# Patient Record
Sex: MALE | Race: WHITE | NOT HISPANIC OR LATINO | Employment: PART TIME | URBAN - METROPOLITAN AREA
[De-identification: names, ages, dates, MRNs, and addresses within clinical notes are randomized per-mention and may not be internally consistent; named-entity substitution may affect disease eponyms.]

---
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
## 2019-01-10 ENCOUNTER — OFFICE VISIT (OUTPATIENT)
Dept: PSYCHOLOGY | Facility: CLINIC | Age: 19
End: 2019-01-10
Payer: COMMERCIAL

## 2019-01-10 DIAGNOSIS — F33.1 MAJOR DEPRESSIVE DISORDER, RECURRENT EPISODE, MODERATE (H): ICD-10-CM

## 2019-01-10 DIAGNOSIS — F41.1 GENERALIZED ANXIETY DISORDER: Primary | ICD-10-CM

## 2019-01-10 PROCEDURE — 90834 PSYTX W PT 45 MINUTES: CPT | Performed by: PSYCHOLOGIST

## 2019-01-10 ASSESSMENT — ANXIETY QUESTIONNAIRES
GAD7 TOTAL SCORE: 10
5. BEING SO RESTLESS THAT IT IS HARD TO SIT STILL: MORE THAN HALF THE DAYS
6. BECOMING EASILY ANNOYED OR IRRITABLE: SEVERAL DAYS
IF YOU CHECKED OFF ANY PROBLEMS ON THIS QUESTIONNAIRE, HOW DIFFICULT HAVE THESE PROBLEMS MADE IT FOR YOU TO DO YOUR WORK, TAKE CARE OF THINGS AT HOME, OR GET ALONG WITH OTHER PEOPLE: SOMEWHAT DIFFICULT
2. NOT BEING ABLE TO STOP OR CONTROL WORRYING: SEVERAL DAYS
7. FEELING AFRAID AS IF SOMETHING AWFUL MIGHT HAPPEN: SEVERAL DAYS
3. WORRYING TOO MUCH ABOUT DIFFERENT THINGS: SEVERAL DAYS
1. FEELING NERVOUS, ANXIOUS, OR ON EDGE: MORE THAN HALF THE DAYS

## 2019-01-10 ASSESSMENT — PATIENT HEALTH QUESTIONNAIRE - PHQ9
SUM OF ALL RESPONSES TO PHQ QUESTIONS 1-9: 12
5. POOR APPETITE OR OVEREATING: MORE THAN HALF THE DAYS

## 2019-01-10 NOTE — PROGRESS NOTES
"               Progress Note - Initial Session    Client Name:  Pa Galvan Date: 1/10/2019     Service Type: Individual/ADHD Eval Intake      Session Start Time: 12:00  Session End Time: 12:45       Session Length: 45 minutes      Session #: 1     Attendees: Client attended alone         Diagnostic Assessment in progress.  Unable to complete documentation at the conclusion of the first session due to gathering extensive information regarding symptom presentation, history, and impact on functioning. Client reported significant history of anxiety, depression, and anger. No risk issues reported.      Mental Status Assessment:  Appearance:   Appropriate   Eye Contact:   Good   Psychomotor Behavior: Normal   Attitude:   Cooperative   Orientation:   All  Speech   Rate / Production: Normal    Volume:  Normal   Mood:    Calm ; Client seemed very relaxed and explained that he had smoked marijuana this morning but did not feel \"high\"  Affect:    Appropriate   Thought Content:  Clear   Thought Form:  Coherent  Logical   Insight:    Good       Safety Issues and Plan for Safety and Risk Management:  Client denies current fears or concerns for personal safety.  Client denies current or recent suicidal ideation or behaviors.  Client denies current or recent homicidal ideation or behaviors.  Client denies current or recent self injurious behavior or ideation.  Client denies other safety concerns.  A safety and risk management plan has not been developed at this time, however client was given the after-hours number / 911 should there be a change in any of these risk factors.  Client reports there are no firearms in the house.      Diagnostic Criteria:  A. Excessive anxiety and worry about a number of events or activities (such as work or school performance).   B. The person finds it difficult to control the worry.  C. Select 3 or more symptoms (required for diagnosis). Only one item is required in children.   - Restlessness or " feeling keyed up or on edge.    - Being easily fatigued.    - Difficulty concentrating or mind going blank.    - Irritability.    - Muscle tension.    - Sleep disturbance (difficulty falling or staying asleep, or restless unsatisfying sleep).   D. The focus of the anxiety and worry is not confined to features of an Axis I disorder.  E. The anxiety, worry, or physical symptoms cause clinically significant distress or impairment in social, occupational, or other important areas of functioning.   F. The disturbance is not due to the direct physiological effects of a substance (e.g., a drug of abuse, a medication) or a general medical condition (e.g., hyperthyroidism) and does not occur exclusively during a Mood Disorder, a Psychotic Disorder, or a Pervasive Developmental Disorder.  A) Recurrent episode(s) - symptoms have been present during the same 2-week period and represent a change from previous functioning 5 or more symptoms (required for diagnosis)   - Depressed mood. Note: In children and adolescents, can be irritable mood.     - Diminished interest or pleasure in all, or almost all, activities.    - Decreased sleep.    - Psychomotor activity agitation.    - Fatigue or loss of energy.    - Feelings of worthlessness or inappropriate guilt.    - Diminished ability to think or concentrate, or indecisiveness.   B) The symptoms cause clinically significant distress or impairment in social, occupational, or other important areas of functioning  C) The episode is not attributable to the physiological effects of a substance or to another medical condition  D) The occurence of major depressive episode is not better explained by other thought / psychotic disorders  E) There has never been a manic episode or hypomanic episode        DSM5 Diagnoses: (Sustained by DSM5 Criteria Listed Above)  Diagnoses: 296.32 (F33.1) Major Depressive Disorder, Recurrent Episode, Moderate _  300.02 (F41.1) Generalized Anxiety Disorder    Cannabis Use Disorder, Moderate  Psychosocial & Contextual Factors: daily marijuana use  WHODAS 2.0 (12 item)            This questionnaire asks about difficulties due to health conditions. Health conditions  include  disease or illnesses, other health problems that may be short or long lasting,  injuries, mental health or emotional problems, and problems with alcohol or drugs.                     Think back over the past 30 days and answer these questions, thinking about how much  difficulty you had doing the following activities. For each question, please [place] only  one response.    S1 Standing for long periods such as 30 minutes? None =         1   S2 Taking care of household responsibilities? None =         1   S3 Learning a new task, for example, learning how to get to a new place? None =         1   S4 How much of a problem do you have joining community activities (for example, festivals, [employer] or other activities) in the same way as anyone else can? None =         1   S5 How much have you been emotionally affected by your health problems? Mild =           2     In the past 30 days, how much difficulty did you have in:   S6 Concentrating on doing something for ten minutes? Mild =           2   S7 Walking a long distance such as a kilometer (or equivalent)? None =         1   S8 Washing your whole body? None =         1   S9 Getting dressed? None =         1   S10 Dealing with people you do not know? None =         1   S11 Maintaining a friendship? None =         1   S12 Your day to day work? Mild =           2     H1 Overall, in the past 30 days, how many days were these difficulties present? Record number of days 19   H2 In the past 30 days, for how many days were you totally unable to carry out your usual activities or work because of any health condition? Record number of days  0   H3 In the past 30 days, not counting the days that you were totally unable, for how many days did you cut back or  reduce your usual activities or work because of any health condition? Record number of days 5       Collateral Reports Completed:  Not Applicable      PLAN: (Homework, other):  Client RTC to complete ADHD DA. He was given self-report and collateral-report measures to complete for our next session.      Sena Chester, PhD, LP

## 2019-01-11 ASSESSMENT — ANXIETY QUESTIONNAIRES: GAD7 TOTAL SCORE: 10

## 2019-04-03 ENCOUNTER — OFFICE VISIT (OUTPATIENT)
Dept: FAMILY MEDICINE | Facility: CLINIC | Age: 19
End: 2019-04-03
Payer: COMMERCIAL

## 2019-04-03 ENCOUNTER — ANCILLARY PROCEDURE (OUTPATIENT)
Dept: GENERAL RADIOLOGY | Facility: CLINIC | Age: 19
End: 2019-04-03
Attending: FAMILY MEDICINE
Payer: COMMERCIAL

## 2019-04-03 VITALS
DIASTOLIC BLOOD PRESSURE: 70 MMHG | SYSTOLIC BLOOD PRESSURE: 116 MMHG | HEIGHT: 67 IN | RESPIRATION RATE: 14 BRPM | BODY MASS INDEX: 24.17 KG/M2 | TEMPERATURE: 98 F | OXYGEN SATURATION: 98 % | HEART RATE: 64 BPM | WEIGHT: 154 LBS

## 2019-04-03 DIAGNOSIS — M22.42 CHONDROMALACIA OF PATELLA, LEFT: ICD-10-CM

## 2019-04-03 DIAGNOSIS — M25.532 LEFT WRIST PAIN: ICD-10-CM

## 2019-04-03 DIAGNOSIS — M76.31 ILIOTIBIAL BAND SYNDROME, RIGHT: ICD-10-CM

## 2019-04-03 DIAGNOSIS — M25.532 LEFT WRIST PAIN: Primary | ICD-10-CM

## 2019-04-03 PROCEDURE — 73110 X-RAY EXAM OF WRIST: CPT | Mod: LT

## 2019-04-03 PROCEDURE — 99213 OFFICE O/P EST LOW 20 MIN: CPT | Performed by: FAMILY MEDICINE

## 2019-04-03 ASSESSMENT — MIFFLIN-ST. JEOR: SCORE: 1672.17

## 2019-04-03 NOTE — PATIENT INSTRUCTIONS
These are overuse injuries of your wrist, knee and hip.  I suspect bruise of the triangular fibrocartilage complex of your left wrist, iliotibial band syndrome and left patellar costochondritis.  Ok to use wrist brace for comfort.  Try to avoid activities that cause pain so that you can heal.

## 2019-04-03 NOTE — PROGRESS NOTES
"  SUBJECTIVE:   Pa Galvan is a 18 year old male who presents to clinic today for the following health issues:  +      Musculoskeletal problem/pain      Duration: 2-3 months    Description  Location: left wrist    Intensity:  moderate    Accompanying signs and symptoms: none    History  Previous similar problem: no   Previous evaluation:  none    Precipitating or alleviating factors:  Trauma or overuse: no   Aggravating factors include: overuse    Therapies tried and outcome: nothing      Notes pain with twisting left wrist.    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    Schedule hasn't changed much.    Notes discomfort of left kneecap with heavy lifting and of right hip at times.    EXAM:  /70   Pulse 64   Temp 98  F (36.7  C) (Oral)   Resp 14   Ht 1.702 m (5' 7\")   Wt 69.9 kg (154 lb)   SpO2 98%   BMI 24.12 kg/m    Constitutional: Healthy, alert, no distress   Cardiovascular: RRR. No murmurs   Respiratory: Clear to auscultation   Musculoskeletal: discomfort along right lateral thigh inferior to greater trochanter, discomfort with palpation of left patella, good strength with extension against resistance. Discomfort along left styloid process of left wrist.    Xray - await radiology results, no fracture appreciated.    ASSESSMENT    ICD-10-CM    1. Left wrist pain M25.532 XR Wrist Left G/E 3 Views     [facility]   2. Iliotibial band syndrome, right M76.31 [facility]   3. Chondromalacia of patella, left M22.42 [facility]      Plan:  Patient Instructions   These are overuse injuries of your wrist, knee and hip.  I suspect bruise of the triangular fibrocartilage complex of your left wrist, iliotibial band syndrome and left patellar costochondritis.  Ok to use wrist brace for comfort.  Try to avoid activities that cause pain so that you can heal.       Return in about 4 weeks (around 5/1/2019), or if symptoms worsen or fail " to improve.    Fabian Markham MD  Family Medicine Physician

## 2019-04-11 ENCOUNTER — THERAPY VISIT (OUTPATIENT)
Dept: PHYSICAL THERAPY | Facility: CLINIC | Age: 19
End: 2019-04-11
Attending: FAMILY MEDICINE
Payer: COMMERCIAL

## 2019-04-11 DIAGNOSIS — M25.561 CHRONIC PAIN OF RIGHT KNEE: ICD-10-CM

## 2019-04-11 DIAGNOSIS — M25.552 BILATERAL HIP PAIN: ICD-10-CM

## 2019-04-11 DIAGNOSIS — M25.532 LEFT WRIST PAIN: ICD-10-CM

## 2019-04-11 DIAGNOSIS — M76.31 ILIOTIBIAL BAND SYNDROME, RIGHT: ICD-10-CM

## 2019-04-11 DIAGNOSIS — G89.29 CHRONIC PAIN OF RIGHT KNEE: ICD-10-CM

## 2019-04-11 DIAGNOSIS — M25.551 BILATERAL HIP PAIN: ICD-10-CM

## 2019-04-11 DIAGNOSIS — M22.42 CHONDROMALACIA OF PATELLA, LEFT: ICD-10-CM

## 2019-04-11 PROCEDURE — 97110 THERAPEUTIC EXERCISES: CPT | Mod: GP | Performed by: PHYSICAL THERAPIST

## 2019-04-11 PROCEDURE — 97161 PT EVAL LOW COMPLEX 20 MIN: CPT | Mod: GP | Performed by: PHYSICAL THERAPIST

## 2019-04-11 PROCEDURE — 97112 NEUROMUSCULAR REEDUCATION: CPT | Mod: GP | Performed by: PHYSICAL THERAPIST

## 2019-04-11 ASSESSMENT — ACTIVITIES OF DAILY LIVING (ADL)
STAND: ACTIVITY IS NOT DIFFICULT
PAIN: THE SYMPTOM AFFECTS MY ACTIVITY MODERATELY
RISE FROM A CHAIR: ACTIVITY IS NOT DIFFICULT
RAW_SCORE: 60
KNEE_ACTIVITY_OF_DAILY_LIVING_SCORE: 85.71
SQUAT: ACTIVITY IS NOT DIFFICULT
WEAKNESS: I HAVE THE SYMPTOM BUT IT DOES NOT AFFECT MY ACTIVITY
KNEE_ACTIVITY_OF_DAILY_LIVING_SUM: 60
SWELLING: I HAVE THE SYMPTOM BUT IT DOES NOT AFFECT MY ACTIVITY
GIVING WAY, BUCKLING OR SHIFTING OF KNEE: I HAVE THE SYMPTOM BUT IT DOES NOT AFFECT MY ACTIVITY
WALK: ACTIVITY IS NOT DIFFICULT
AS_A_RESULT_OF_YOUR_KNEE_INJURY,_HOW_WOULD_YOU_RATE_YOUR_CURRENT_LEVEL_OF_DAILY_ACTIVITY?: NORMAL
LIMPING: I DO NOT HAVE THE SYMPTOM
GO DOWN STAIRS: ACTIVITY IS NOT DIFFICULT
STIFFNESS: THE SYMPTOM AFFECTS MY ACTIVITY SEVERELY
GO UP STAIRS: ACTIVITY IS NOT DIFFICULT
HOW_WOULD_YOU_RATE_THE_CURRENT_FUNCTION_OF_YOUR_KNEE_DURING_YOUR_USUAL_DAILY_ACTIVITIES_ON_A_SCALE_FROM_0_TO_100_WITH_100_BEING_YOUR_LEVEL_OF_KNEE_FUNCTION_PRIOR_TO_YOUR_INJURY_AND_0_BEING_THE_INABILITY_TO_PERFORM_ANY_OF_YOUR_USUAL_DAILY_ACTIVITIES?: 100
HOW_WOULD_YOU_RATE_THE_OVERALL_FUNCTION_OF_YOUR_KNEE_DURING_YOUR_USUAL_DAILY_ACTIVITIES?: NORMAL
KNEEL ON THE FRONT OF YOUR KNEE: ACTIVITY IS NOT DIFFICULT
SIT WITH YOUR KNEE BENT: ACTIVITY IS NOT DIFFICULT

## 2019-04-11 NOTE — PROGRESS NOTES
[facility] Initial Evaluation  Subjective:  The history is provided by the patient. No  was used.   Pa Galvan is a 18 year old male with a lumbar condition.  Condition occurred with:  Insidious onset.  Condition occurred: for unknown reasons.  This is a chronic condition  4/3/19. Patient reports history of LBP since he was about 9 y. o. Denies specific KIAN. Patient was referred to PT on 4/3/19..    Patient reports pain:  Lumbar spine left and lumbar spine right.  Radiates to:  Gluteals right, lower leg right and thigh right.  Pain is described as aching and is constant and reported as 3/10.   Pain is worse in the A.M..  Symptoms are exacerbated by other, lifting and carrying (carrying objects on his back) and relieved by other (lying on back with legs elevated).  Since onset symptoms are unchanged.    Previous treatment includes chiropractic.  There was mild improvement following previous treatment.  General health as reported by patient is excellent.  Pertinent medical history includes:  Migraines/headaches.  Medical allergies: no.  Other surgeries include:  No.  Current medications:  None as reported by the patient.  Current occupation is .  Patient is working in normal job without restrictions.  Primary job tasks include:  Lifting and repetitive tasks.    Barriers include:  None as reported by the patient.    Red flags:  None as reported by the patient.      Pa Galvan is a 18 year old male with a left knee condition.  Condition occurred with:  Insidious onset.  Condition occurred: for unknown reasons.  This is a chronic condition  4/3/19. Patient started having medial R knee pain about 1 year ago. Denies specific KIAN. Patient was referred to PT on 4/3/19..    Patient reports pain:  Medial.    Pain is described as aching and is intermittent and reported as 4/10.   Pain is worse during the day.  Symptoms are exacerbated by running and relieved  by rest.  Since onset symptoms are unchanged.                                                      Objective:  Standing Alignment:    Cervical/Thoracic:  Forward head  Shoulder/UE:  Rounded shoulders                             Lumbar/SI Evaluation  ROM:    AROM Lumbar:   Flexion:          WNL -  Ext:                    Min loss +   Side Bend:        Left:     Right:   Rotation:           Left:     Right:   Side Glide:        Left:  Min loss + low back    Right:  WNL -/+ low back          Lumbar Myotomes:    T12-L3 (Hip Flex):  Left: 5    Right: 5  L2-4 (Quads):  Left:  5    Right:  5  L4 (Ankle DF):  Left:  5    Right:  5  L5 (Great Toe Ext): Left: 5    Right: 5   S1 (Toe Raise):  Left: 5    Right: 5  Lumbar DTR's:    L4 (Quad):  Left:  2   Right:  2  S1 ([place]illes):  Left:  2   Right:  2    Lumbar Dermtomes:        L2 Left:  Normal-light touch     L2 Right:  Normal-light touch  L3 Left:  Normal-light touch     L3 Right:  Normal-light touch  L4 Left:  Normal-light touch       L4 Right:  Normal-light touch  L5 Left:  Normal-light touch     L5 Right:  Normal-light touch  S1 Left:  Normal-light touch     S1 Right:  Normal-light touch  Neural Tension/Mobility:      Left side:Slump  negative.     Right side:   Slump  negative.                                                          [place] Lumbar Evaluation    Posture:  Sitting: poor  Standing: fair  Lordosis: WNL  Lateral Shift: nil  Correction of Posture: better      Test Movements:  FIS: During: no effect  After: no effect  Pretest Movements: B LBP  Repeat FIS: During: no effect  After: no effect  Mechanical Response: no effect  EIS: During: increases  After: no worse    Repeat EIS: During: increases  After: no worse  Mechanical Response: no effect  GIUSEPPE: During: no effect  After: no effect    Repeat GIUSEPPE: During: decreases  After: better  Mechanical Response: DecrROM  EIL: During: no effect  After: no effect    Repeat EIL: During: no effect  After: better  Mechanical  Response: IncROM      Static Tests:          Lying Prone in Extension: ANDRADE: no effect, no effect    Conclusion: derangement  Principle of Treatment:  Posture Correction: posture correction    Extension: REIL                                           ROS    Assessment/Plan:    Patient is a 18 year old male with lumbar and right side knee complaints.    Patient has the following significant findings with corresponding treatment plan.                Diagnosis 1:  Hip pain  Pain -  hot/cold therapy, manual therapy, self management, education, directional preference exercise and home program  Decreased ROM/flexibility - manual therapy, therapeutic exercise and home program  Decreased proprioception - neuro re-education, therapeutic activities and home program  Impaired muscle performance - neuro re-education and home program  Decreased function - therapeutic activities and home program  Impaired posture - neuro re-education and home program  Diagnosis 2:  Knee pain   Pain -  hot/cold therapy, manual therapy, splint/taping/bracing/orthotics, self management, education and home program  Decreased strength - therapeutic exercise, therapeutic activities and home program  Decreased proprioception - neuro re-education, therapeutic activities and home program  Impaired muscle performance - neuro re-education and home program  Decreased function - therapeutic activities and functional performance testing    Therapy Evaluation Codes:   1) History comprised of:   Personal factors that impact the plan of care:      Time since onset of symptoms.    Comorbidity factors that impact the plan of care are:      Migraines/headaches.     Medications impacting care: None.  2) Examination of Body Systems comprised of:   Body structures and functions that impact the plan of care:      Knee and Lumbar spine.   Activity limitations that impact the plan of care are:      Lifting and Running.  3) Clinical presentation characteristics  are:   Stable/Uncomplicated.  4) Decision-Making    Low complexity using standardized patient assessment instrument and/or measureable assessment of functional outcome.  Cumulative Therapy Evaluation is: Low complexity.    Previous and current functional limitations:  (See Goal Flow Sheet for this information)    Short term and Long term goals: (See Goal Flow Sheet for this information)     Communication ability:  Patient appears to be able to clearly communicate and understand verbal and written communication and follow directions correctly.  Treatment Explanation - The following has been discussed with the patient:   RX ordered/plan of care  Anticipated outcomes  Possible risks and side effects  This patient would benefit from PT intervention to resume normal activities.   Rehab potential is good.    Frequency:  1 X week, once daily  Duration:  for 4 weeks tapering to 1 X every other week over 4 weeks  Discharge Plan:  Achieve all LTG.  Independent in home treatment program.  Reach maximal therapeutic benefit.    Please refer to the daily flowsheet for treatment today, total treatment time and time spent performing 1:1 timed codes.

## 2019-04-16 ENCOUNTER — THERAPY VISIT (OUTPATIENT)
Dept: PHYSICAL THERAPY | Facility: CLINIC | Age: 19
End: 2019-04-16
Payer: COMMERCIAL

## 2019-04-16 DIAGNOSIS — G89.29 CHRONIC PAIN OF RIGHT KNEE: ICD-10-CM

## 2019-04-16 DIAGNOSIS — M25.552 BILATERAL HIP PAIN: ICD-10-CM

## 2019-04-16 DIAGNOSIS — M25.551 BILATERAL HIP PAIN: ICD-10-CM

## 2019-04-16 DIAGNOSIS — M25.561 CHRONIC PAIN OF RIGHT KNEE: ICD-10-CM

## 2019-04-16 PROCEDURE — 97140 MANUAL THERAPY 1/> REGIONS: CPT | Mod: GP | Performed by: PHYSICAL THERAPIST

## 2019-04-16 PROCEDURE — 97110 THERAPEUTIC EXERCISES: CPT | Mod: GP | Performed by: PHYSICAL THERAPIST

## 2019-04-16 PROCEDURE — 97112 NEUROMUSCULAR REEDUCATION: CPT | Mod: GP | Performed by: PHYSICAL THERAPIST

## 2019-07-18 ENCOUNTER — TELEPHONE (OUTPATIENT)
Dept: FAMILY MEDICINE | Facility: CLINIC | Age: 19
End: 2019-07-18

## 2019-07-18 NOTE — TELEPHONE ENCOUNTER
Panel Management Review      Patient has the following on his problem list:     Depression / Dysthymia review    Measure:  Needs PHQ-9 score of 4 or less during index window.  Administer PHQ-9 and if score is 5 or more, send encounter to provider for next steps.    5 - 7 month window range:     PHQ-9 SCORE 2/20/2017 9/24/2018 1/10/2019   PHQ-9 Total Score 23 22 12       If PHQ-9 recheck is 5 or more, route to provider for next steps.    Patient is due for:  PHQ9      Composite cancer screening  Chart review shows that this patient is due/due soon for the following None  Summary:    Patient is due/failing the following:   PHQ9    Action needed:   Patient needs to do PHQ9.    Type of outreach:    Phone, spoke to patient.  Patient cannot complete questionnaire at this time and will call back to complete phq 9.    Questions for provider review:    None                                                                                                                                    Yessica Kaufman CMA     Chart routed to Care Team .

## 2019-12-09 NOTE — PROGRESS NOTES
Discharge Note    Progress reporting period is from last progress note on   to Apr 16, 2019.    Pa failed to follow up and current status is unknown.  Please see information below for last relevant information on current status.  Patient seen for 2 visits.    SUBJECTIVE  Subjective changes noted by patient:  LBP feels same. Decreased pain x 15 min w/ REIL. Locates pain over B low back. Pain is constant. B knee pain increases w/ prolonged standing and running.  .  Current pain level is (3.5/10 low back and 0/10 knees).     Previous pain level was  3/10(low back and 4/10 R knee).   Changes in function:  Yes (See Goal flowsheet attached for changes in current functional level)  Adverse reaction to treatment or activity: None    OBJECTIVE  Changes noted in objective findings: Lumbar AROM: flex WNL -/+, ext min loss + B low back, B SG WNL + contralateral low back.     ASSESSMENT/PLAN  Diagnosis: hip pain/LBP   Updated problem list and treatment plan:   Pain - HEP  Decreased ROM/flexibility - HEP  Decreased function - HEP  Impaired muscle performance - HEP  Decreased proprioception - HEP  Impaired posture - HEP  Diagnosis 2: Knee Pain  Pain-HEP  Decreased Strength-HEP  Decreased proprioception -HEP  Impaired muscle performance -HEP  Decreased function -HEP          STG/LTGs have been met or progress has been made towards goals:  Yes, please see goal flowsheet for most current information  Assessment of Progress: current status is unknown.    Last current status:     Self Management Plans:  HEP  I have re-evaluated this patient and find that the nature, scope, duration and intensity of the therapy is appropriate for the medical condition of the patient.  Pa continues to require the following intervention to meet STG and LTG's:  HEP.    Recommendations:  Discharge with current home program.  Patient to follow up with MD as needed.    Please refer to the daily flowsheet for treatment today, total treatment time and time  spent performing 1:1 timed codes.

## 2020-03-07 ENCOUNTER — OFFICE VISIT (OUTPATIENT)
Dept: URGENT CARE | Facility: URGENT CARE | Age: 20
End: 2020-03-07
Payer: COMMERCIAL

## 2020-03-07 VITALS
OXYGEN SATURATION: 100 % | HEART RATE: 77 BPM | SYSTOLIC BLOOD PRESSURE: 139 MMHG | HEIGHT: 68 IN | WEIGHT: 160 LBS | BODY MASS INDEX: 24.25 KG/M2 | DIASTOLIC BLOOD PRESSURE: 85 MMHG | TEMPERATURE: 98.3 F

## 2020-03-07 DIAGNOSIS — S61.451A HUMAN BITE OF RIGHT HAND, INITIAL ENCOUNTER: Primary | ICD-10-CM
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
## 2020-03-07 PROCEDURE — 90715 TDAP VACCINE 7 YRS/> IM: CPT | Performed by: INTERNAL MEDICINE

## 2020-03-07 PROCEDURE — 99213 OFFICE O/P EST LOW 20 MIN: CPT | Mod: 25 | Performed by: INTERNAL MEDICINE

## 2020-03-07 PROCEDURE — 90471 IMMUNIZATION ADMIN: CPT | Performed by: INTERNAL MEDICINE

## 2020-03-07 ASSESSMENT — ENCOUNTER SYMPTOMS: WOUND: 1

## 2020-03-07 ASSESSMENT — MIFFLIN-ST. JEOR: SCORE: 1710.26

## 2020-03-07 NOTE — PATIENT INSTRUCTIONS
Augmentin 2 x day 5 days  antibiotics ointment 2 x day              Patient Education     Human Bite  The mouth has bacteria (germs) that can cause a very severe infection. If the tooth of another person has cut your skin, there is a chance of a serious infection developing within the first few days. Bites to the hand are especially prone to infection of the skin (such as cellulitis). Diseases (such as hepatitis B or C, or herpes simplex virus) may also be transmitted through human bites.  Human bite wounds may be either sutured closed or left open to heal depending on location, length of time since the bite, severity, signs of infection, and other concerns. Your doctor may want to do blood tests, a wound culture, X-ray, ultrasound, or others. Your doctor will explain if you need any of these and discuss your results.  Home care  The following will help you care for your wound at home:  1. Most skin wounds heal within 10 days. However, a human bite wound has a higher risk of getting infected. Look at the bite area each day for the next 4 days for signs of infection (listed below).  2. For certain types of wounds, an antibiotic will be prescribed. This will depend on several factors such as severity, surrounding structure injury, depth, location, and others. Take all antibiotics and medicines as directed until they are all gone.  3. If the bite is on the hand, arm, foot, or leg, limit the use of that extremity and keep it elevated for the first 24 hours.  4. You may be given a tetanus shot if needed.  5. Don't suck on the wound. This may introduce more bacteria.  Follow-up care  Follow up with your healthcare provider, or this facility as directed.  When to seek medical advice  Call your healthcare provider right away if you have any of these:    Spreading redness    Increased pain or swelling    Fever of 100.4  F (38  C) or higher, or as directed by your healthcare provider    Colored fluid or pus draining from the  wound    Any signs of nerve or tendon damage, such as inability to bend a joint or feel an area of skin  Date Last Reviewed: 6/1/2016 2000-2019 [employer]. [address] [place] [address], [place] [address]. All rights reserved. This information is not intended as a substitute for professional medical care. Always follow your healthcare professional's instructions.
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
## 2020-03-27 PROBLEM — G89.29 CHRONIC PAIN OF RIGHT KNEE: Status: RESOLVED | Noted: 2019-04-16 | Resolved: 2020-03-27

## 2020-03-27 PROBLEM — M25.551 BILATERAL HIP PAIN: Status: RESOLVED | Noted: 2019-04-16 | Resolved: 2020-03-27

## 2020-03-27 PROBLEM — M25.561 CHRONIC PAIN OF RIGHT KNEE: Status: RESOLVED | Noted: 2019-04-16 | Resolved: 2020-03-27

## 2020-03-27 PROBLEM — M25.552 BILATERAL HIP PAIN: Status: RESOLVED | Noted: 2019-04-16 | Resolved: 2020-03-27

## 2020-08-18 ENCOUNTER — NURSE TRIAGE (OUTPATIENT)
Dept: NURSING | Facility: CLINIC | Age: 20
End: 2020-08-18

## 2020-08-18 NOTE — TELEPHONE ENCOUNTER
Clinic Action Needed?  Yes. Please call pt on cell  [phone] to discuss psychiatry referral     [facility]NA Triage Call   Presenting Problem:     Pt tried to schedule appointment w/ psychiatrist at [facility]. He was told he must have referral from PCP to schedule w/ psych.      PCP Trang Dubon PA-C at  - currently working at .    Patient Teaching/Discussion:  Advised pt message sent to PCP's team to call him to discuss.     Routed to: [facility]

## 2020-08-19 NOTE — TELEPHONE ENCOUNTER
Needs a virtual appointment-Has not been seen sine 2018-Needs to be evaluated within 30 days prior to referral placement per their protocol. Please call to assist. Apoorva Velez RN.      LVM to patient to call back to set up an appointment.       .Naomi Mea MA

## 2020-08-27 ENCOUNTER — OFFICE VISIT (OUTPATIENT)
Dept: FAMILY MEDICINE | Facility: CLINIC | Age: 20
End: 2020-08-27
Payer: COMMERCIAL

## 2020-08-27 ENCOUNTER — ANCILLARY PROCEDURE (OUTPATIENT)
Dept: GENERAL RADIOLOGY | Facility: CLINIC | Age: 20
End: 2020-08-27
Attending: PHYSICIAN ASSISTANT
Payer: COMMERCIAL

## 2020-08-27 VITALS
RESPIRATION RATE: 16 BRPM | SYSTOLIC BLOOD PRESSURE: 129 MMHG | DIASTOLIC BLOOD PRESSURE: 79 MMHG | HEART RATE: 56 BPM | TEMPERATURE: 97.8 F | WEIGHT: 160.2 LBS | HEIGHT: 67 IN | BODY MASS INDEX: 25.15 KG/M2 | OXYGEN SATURATION: 97 %

## 2020-08-27 DIAGNOSIS — M25.562 CHRONIC PAIN OF BOTH KNEES: Primary | ICD-10-CM

## 2020-08-27 DIAGNOSIS — G89.29 CHRONIC PAIN OF BOTH KNEES: Primary | ICD-10-CM

## 2020-08-27 DIAGNOSIS — F43.23 ADJUSTMENT DISORDER WITH MIXED ANXIETY AND DEPRESSED MOOD: ICD-10-CM

## 2020-08-27 DIAGNOSIS — M25.561 CHRONIC PAIN OF BOTH KNEES: Primary | ICD-10-CM

## 2020-08-27 PROCEDURE — 99214 OFFICE O/P EST MOD 30 MIN: CPT | Performed by: PHYSICIAN ASSISTANT

## 2020-08-27 PROCEDURE — 73565 X-RAY EXAM OF KNEES: CPT

## 2020-08-27 ASSESSMENT — MIFFLIN-ST. JEOR: SCORE: 1690.29

## 2020-08-27 NOTE — PROGRESS NOTES
"Subjective     Pa Galvan is a 20 year old male who presents to clinic today for the following health issues:    HPI       Possible torn meniscus   Onset/Duration: x 6 months   Description  Location: knee - bilateral  Joint Swelling: no  Redness: no  Pain: YES  Warmth: no  Intensity:  moderate  Progression of Symptoms:  worsening  Accompanying signs and symptoms:   Fevers: no  Numbness/tingling/weakness: YES weakness both legs   History  Trauma to the area: no  Recent illness:  no  Previous similar problem: YES  Previous evaluation:  no  Precipitating or alleviating factors:  Aggravating factors include: walking  Therapies tried and outcome: nothing    Bilateral knee pain left > right for the past 6 months. He previously worked for a moving company and believes the pain stems from repetitive stress and lifting while at that job. He denies acute injury. No bruising, swelling, redness, or warmth. No prior history of knee problems. He does notice clicking to both knees but denies locking. He would like xrays today.     He has tried ibuprofen which did not provide relief. He has not applied ice.     Other: he would like referral for psychiatric services at [facility].        Review of Systems   Constitutional, HEENT, cardiovascular, pulmonary, gi and gu systems are negative, except as otherwise noted.      Objective    /79 (BP Location: Right arm, Patient Position: Sitting, Cuff Size: Adult Regular)   Pulse 56   Temp 97.8  F (36.6  C) (Tympanic)   Resp 16   Ht 1.702 m (5' 7\")   Wt 72.7 kg (160 lb 3.2 oz)   SpO2 97%   BMI 25.09 kg/m    Body mass index is 25.09 kg/m .  Physical Exam  Vitals signs and nursing note reviewed.   Constitutional:       Appearance: Normal appearance.   HENT:      Head: Normocephalic and atraumatic.   Eyes:      Conjunctiva/sclera: Conjunctivae normal.   Pulmonary:      Effort: Pulmonary effort is normal.   Musculoskeletal: Normal range of motion.         General: No " deformity or signs of injury.      Right lower leg: No edema.      Left lower leg: No edema.      Comments: No swelling or redness   Left knee pain medial with valgus stress   Mild pain with Apley grind bilateral   Skin:     General: Skin is warm and dry.   Neurological:      Mental Status: He is alert and oriented to person, place, and time.   Psychiatric:         Mood and Affect: Mood normal.         Behavior: Behavior normal.        Xray - normal joint spaces, no fracture or dislocation        Assessment & Plan   Problem List Items Addressed This Visit        Behavioral    Adjustment disorder with mixed anxiety and depressed mood    Relevant Orders    MENTAL HEALTH REFERRAL  - Adult; Psychiatry; Psychiatry; [facility]: Collaborative Care Psychiatry Service/Bridge to Long-Term Psychiatry as indicated ([phone]); Yes; Several Medications tried and failed; Yes; We will contact you to schedule the a...      Other Visit Diagnoses     Chronic pain of both knees    -  Primary    Relevant Orders    XR Knee AP Standing Bilateral    PHYSICAL THERAPY REFERRAL        20 year old male with history of anxiety presenting for bilateral knee pain x 6 months.     Xray unremarkable  Exam positive for mild pain left medial knee with valgus stress, mild pain with Apley grind bilaterally.   Suspect overuse injury  Will refer to PT for further care and evaluation  If symptoms not improving or worsening could consider MRI for further characterization  Encouraged patient to use ice and to stretch  PRN tylenol and/or ibuprofen     Other: follow up with psychiatry at [facility].     Return for physical therapy.    Javier Hsu PA-C  [facility]

## 2020-10-08 ENCOUNTER — VIRTUAL VISIT (OUTPATIENT)
Dept: PSYCHOLOGY | Facility: CLINIC | Age: 20
End: 2020-10-08
Payer: COMMERCIAL

## 2020-10-08 DIAGNOSIS — F41.1 GENERALIZED ANXIETY DISORDER: Primary | ICD-10-CM

## 2020-10-08 PROCEDURE — 90791 PSYCH DIAGNOSTIC EVALUATION: CPT | Mod: 95 | Performed by: MARRIAGE & FAMILY THERAPIST

## 2020-10-08 ASSESSMENT — COLUMBIA-SUICIDE SEVERITY RATING SCALE - C-SSRS
2. HAVE YOU ACTUALLY HAD ANY THOUGHTS OF KILLING YOURSELF LIFETIME?: YES
ATTEMPT LIFETIME: NO
ATTEMPT PAST THREE MONTHS: NO
5. HAVE YOU STARTED TO WORK OUT OR WORKED OUT THE DETAILS OF HOW TO KILL YOURSELF? DO YOU INTEND TO CARRY OUT THIS PLAN?: NO
2. HAVE YOU ACTUALLY HAD ANY THOUGHTS OF KILLING YOURSELF?: NO
TOTAL  NUMBER OF ABORTED OR SELF INTERRUPTED ATTEMPTS PAST 3 MONTHS: NO
1. IN THE PAST MONTH, HAVE YOU WISHED YOU WERE DEAD OR WISHED YOU COULD GO TO SLEEP AND NOT WAKE UP?: YES
TOTAL  NUMBER OF INTERRUPTED ATTEMPTS PAST 3 MONTHS: NO
5. HAVE YOU STARTED TO WORK OUT OR WORKED OUT THE DETAILS OF HOW TO KILL YOURSELF? DO YOU INTEND TO CARRY OUT THIS PLAN?: NO
4. HAVE YOU HAD THESE THOUGHTS AND HAD SOME INTENTION OF ACTING ON THEM?: NO
TOTAL  NUMBER OF INTERRUPTED ATTEMPTS LIFETIME: NO
TOTAL  NUMBER OF ABORTED OR SELF INTERRUPTED ATTEMPTS PAST LIFETIME: NO
4. HAVE YOU HAD THESE THOUGHTS AND HAD SOME INTENTION OF ACTING ON THEM?: NO
6. HAVE YOU EVER DONE ANYTHING, STARTED TO DO ANYTHING, OR PREPARED TO DO ANYTHING TO END YOUR LIFE?: NO
1. IN THE PAST MONTH, HAVE YOU WISHED YOU WERE DEAD OR WISHED YOU COULD GO TO SLEEP AND NOT WAKE UP?: YES
6. HAVE YOU EVER DONE ANYTHING, STARTED TO DO ANYTHING, OR PREPARED TO DO ANYTHING TO END YOUR LIFE?: NO
3. HAVE YOU BEEN THINKING ABOUT HOW YOU MIGHT KILL YOURSELF?: YES

## 2020-10-08 ASSESSMENT — ANXIETY QUESTIONNAIRES
GAD7 TOTAL SCORE: 21
7. FEELING AFRAID AS IF SOMETHING AWFUL MIGHT HAPPEN: NEARLY EVERY DAY
6. BECOMING EASILY ANNOYED OR IRRITABLE: NEARLY EVERY DAY
7. FEELING AFRAID AS IF SOMETHING AWFUL MIGHT HAPPEN: NEARLY EVERY DAY
5. BEING SO RESTLESS THAT IT IS HARD TO SIT STILL: NEARLY EVERY DAY
2. NOT BEING ABLE TO STOP OR CONTROL WORRYING: NEARLY EVERY DAY
3. WORRYING TOO MUCH ABOUT DIFFERENT THINGS: NEARLY EVERY DAY
4. TROUBLE RELAXING: NEARLY EVERY DAY
GAD7 TOTAL SCORE: 21
1. FEELING NERVOUS, ANXIOUS, OR ON EDGE: NEARLY EVERY DAY
GAD7 TOTAL SCORE: 21

## 2020-10-08 ASSESSMENT — PATIENT HEALTH QUESTIONNAIRE - PHQ9
SUM OF ALL RESPONSES TO PHQ QUESTIONS 1-9: 22
10. IF YOU CHECKED OFF ANY PROBLEMS, HOW DIFFICULT HAVE THESE PROBLEMS MADE IT FOR YOU TO DO YOUR WORK, TAKE CARE OF THINGS AT HOME, OR GET ALONG WITH OTHER PEOPLE: EXTREMELY DIFFICULT
SUM OF ALL RESPONSES TO PHQ QUESTIONS 1-9: 22

## 2020-10-08 NOTE — PROGRESS NOTES
Answers for HPI/ROS submitted by the patient on 10/8/2020   If you checked off any problems, how difficult have these problems made it for you to do your work, take care of things at home, or get along with other people?: Extremely difficult  PHQ9 TOTAL SCORE: 22  VICKEY 7 TOTAL SCORE: 21    [facility]  Evaluator Name:  Yogi Cotton     Credentials:  LMFT    PATIENT'S NAME: Pa Galvan  PREFERRED NAME: Pa  PRONOUNS:       MRN:   [id number]  :   2000   ACCT. NUMBER: [id number]  DATE OF SERVICE: 10/08/20  START TIME: 9:00a  END TIME: 10:00a  PREFERRED PHONE:   May we leave a program related message: Yes  SERVICE MODALITY:  Video Visit:    Telemedicine Visit: The patient's condition can be safely assessed and treated via synchronous audio and visual telemedicine encounter.      Reason for Telemedicine Visit: Services only offered telehealth    Originating Site (Patient Location): Patient's home    Distant Site (Provider Location): Provider Remote Setting    Consent:  The patient/guardian has verbally consented to: the potential risks and benefits of telemedicine (video visit) versus in person care; bill my insurance or make self-payment for services provided; and responsibility for payment of non-covered services.     Mode of Communication:  Video Conference via [employer]    As the provider I attest to compliance with applicable laws and regulations related to telemedicine.    UNIVERSAL ADULT DIAGNOSTIC ASSESSMENT      Identifying Information:  Patient is a 20 year old, .  The pronoun use throughout this assessment reflects the patient's chosen pronoun.  Patient was referred for an assessment by self.  Patient attended the session alone.     Chief Complaint:   PT is currently seeking therapy services due to ongoing struggles with self-image, self-worth.  He is currently in a day tx program through [facility] which is 2 days a week, he has about 1 month left. He feels that his  "anxiety will increase and this will lead to less concentration. Depressive sx are still evident, he tries to suppress things and small things will set him off and he lacks the skills to decrease the effect on himself.     Goals: Decrease the effect of his depression and anxiety.  Learn skills to manage these better so that his functioning is improved. Also work on positivity about his self-image.    Coping Skills: Pt works full time; active outside-skateboarding, fishing, canoeing; arts and crafts with sewing and crocheting; talking with previous tx peers; reading 12 step materials.     Social/Family History:  Client reported he grew up in [place], [place]. Client was the first born of 2 children. He has a younger brother.  This is an intact family and parents remain . Client reported that his childhood was \"pretty good.\" He stated, \"It was mostly just me and my parents and I also saw grandparents often. I don't have a really big family.\" He explained that he had difficulty getting along with parents when he became a teenager. Client described his childhood family environment as nurturing and stable. As a child, client reported that he failed to complete assigned chores in the home environment, had problems getting ready for school in the morning, had problems with organization and keeping track of items, misplaced or lost things and had problems managing temper with frequent emotional outbursts. He stated, \"I was really good at procrastinating.\" Client reported difficulty and no difficulty with childhood peer relationships. He reported that he was bullied by other kids at times \"not very much\" but felt teased at times. He explained that he had difficulty making and keeping friends because he felt shy and stated, \"I didn't have a big group of friends.\" Client described his current relationships with family of origin as supportive with frequent communication.    Client's highest education level was high school " "graduate. Client graduated high school in 2017 with a 2.8 GPA. He estimated he obtained mostly Cs. During the elementary, middle, and high school years, patient recalls academic strengths in the area of history. He noted he is very interested in this topic. Client reported experiencing academic problems in math, science and English. Client did identify the following learning problems: attention and concentration. Client did receive tutoring services during the school years (for math in elementary school). Client did not receive special education services. Client reported significant behavior and discipline problems including: suspension or expulsion from school, physical or verbal alteracations and disruptive classroom behavior and failure to finish or complete homework.  He reported that he would procrastinate on homework assignments. He stated, \"Sometimes I would get things done, but other times I would rather be doing anything else. I would put it off for as long as I could.\" Client transferred to an alternative school in 11th grade and explained it was easier for him to learn in the smaller setting with only 45 kids at the school. He reported that his grades improved when he was at this school (mostly As and Bs). He stated, \"It wasn't that hard for me there as long as I showed up which was pretty easy.\" He explained that there was homework but he was given more time to do it at school. Client reported that he tried his best to take notes but would struggle with paying attention. Client reported that he would get into trouble for talking to other kids at school.  Client reported that he would sometimes stay home instead of going to school \"because I didn't feel like going to school that day\" and noted that sometimes he was trying to get out of doing something at school or he felt anxious or depressed. He reported that he got along well with peers but was suspended once in 6th grade for fighting. Client did not " "attend post secondary school. He did some PSEO classes in high school (through [employer]; \"intro to welding\" and \"intro to college\") but has not pursued additional education since graduation. He stated, \"I didn't do too bad in those. I got mostly Cs and one B.\"  -Currently, pt has not engaged in any higher level of education.    Pt is currently in a relationship for the past 3 months, they met online. Her name is Peggy, he finds things to be positive and supportive.  Common interests between them they have great times together. PT has no children.    Patient identified partner, mother, father, siblings and friends as part of their support system.  Patient identified the quality of these relationships as stable and meaningful.      PT currently lives in [place] with roommates. He feels it is safe and stable.      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       PT is on probation with [place] probation for just under a year.     Patient's Strengths and Limitations:  Patient identified the following strengths or resources that will help them succeed in treatment: commitment to health and well being, exercise routine, friends / good social support, family support, insight, motivation and sober support group / recovery support . Things that may interfere with the patient's success in treatment include: none identified.     Personal and Family Medical History:  Client reported the following biological family members or relatives with mental health issues: Paternal Grandmother experienced Anxiety and Depression and Paternal Grandfather experienced Anxiety and Depression. Client previously received the following mental health diagnosis: ADHD, Anxiety and Adjustment Disorder with anxiety and depression. Client " "has received the following mental health services in the past: medication(s) from physician / PCP, primary care behavioral health provider and psychiatry. He was previously treated at the [facility] psychiatry outpatient clinic from [id number] (beginning at age 11/12). Client has been prescribed Celexa, Adderall, Strattera, Intuniv, Risperdal, Lexapro, Buspar, Zoloft, Wellbutrin, and Effexor. He noted that \"nothing worked.\" He previously met with a C at the [facility] in 2018. Hospitalizations: None.  Previous / current commitments: None. Client is currently receiving the following services with day treatment program through [facility]. Prior to this pt was IP at [facility] for 55 days.    Patient does not report Mental Health Diagnosis or Treatment.      Patient has not had a physical exam to rule out medical causes for current symptoms.  Date of last physical exam was greater than a year ago and client was encouraged to schedule an exam with PCP. The patient has a [place] Primary Care Provider, who is named Trang Dubon..  Patient reports the following current medical concerns: Possible knee issues with meniscus, .  There are not significant appetite / nutritional concerns / weight changes.   Patient does not report a history of head injury / trauma / cognitive impairment.      Patient reports not taking any current medications    Patient Allergies:    Allergies   Allergen Reactions     No Known Drug Allergies        Medical History:    Past Medical History:   Diagnosis Date     NO ACTIVE PROBLEMS          Current Mental Status Exam:   Appearance:  Appropriate    Eye Contact:  Good   Psychomotor:  Normal       Gait / station:  no problem  Attitude / Demeanor: Cooperative   Speech      Rate / Production: Normal/ Responsive      Volume:  Normal  volume      Language:  intact  Mood:   Normal  Affect:   Appropriate    Thought Content: Clear   Thought Process: Coherent       Associations: No " loosening of associations  Insight:   Good   Judgment:  Intact   Orientation:  All  Attention/concentration: Good    Rating Scales:    PHQ9:    PHQ-9 SCORE 9/24/2018 1/10/2019 10/8/2020   PHQ-9 Total Score MyChart - - 22 (Severe depression)   PHQ-9 Total Score 22 12 22   ;    GAD7:    VICKEY-7 SCORE 9/24/2018 1/10/2019 10/8/2020   Total Score - - 21 (severe anxiety)   Total Score 21 10 21     CGI:     First:No data recorded;    Most recentNo data recorded    Substance Use:  Patient did not report a family history of substance use concerns; see medical history section for details.  Patient has received chemical dependency treatment in the past at [facility].  Patient has not ever been to detox.      Patient is currently receiving the following services: CD Treatment at [facility] and participate(s) in AA / NA . Patient reported the following problems as a result of their substance use: academic, financial problems, legal issues and relationship problems.    Patient denies using alcohol. for the past 2 months  Patient uses an e-cig, daily.  Patient denies using marijuana.  Patient drinks caffeine 2-3 cups daily.  Patient reports using/abusing the following substance(s). Patient reported no other substance use.     CAGE- AID:  No flowsheet data found.    Substance Use: No symptoms    Based on the negative CAGE score and clinical interview there  are not indications of drug or alcohol abuse.      Significant Losses / Trauma / Abuse / Neglect Issues:   Patient did not serve in the .  There are indications or report of significant loss, trauma, abuse or neglect issues related to: death of death of grandmother in 2007.  Concerns for possible neglect are not present.     Safety Assessment:   Current Safety Concerns:  [place] Suicide Severity Rating Scale (Lifetime/Recent)  [place] Suicide Severity Rating (Lifetime/Recent) 10/8/2020   1. Wish to be Dead (Lifetime) Yes   1. Wish to be Dead (Recent) Yes   Wish to be Dead  Description (Recent) Feeling so overwhelmed that he wished he did not have to continue living in this situation, frustrated at his point in life compared to peers   2. Non-Specific Active Suicidal Thoughts (Lifetime) Yes   Non-Specific Active Suicidal Thought Description (Lifetime) Last occured prior to tx, not wanting to do more tx due to the exhaustion he was feeling   2. Non-Specific Active Suicidal Thoughts (Recent) No   3. Active Suicidal Ideation with any Methods (Not Plan) Without Intent to Act (Lifetime) Yes   Active Suicidal Ideation with any Methods (Not Plan) Description (Lifetime) PT had thought about a method of jumping off a high place or crashing car,  last occurred 6 months ago   3. Active Sucidal Ideation with any Methods (Not Plan) Without Intent to Act (Recent) No   4. Active Suicidal Ideation with Some Intent to Act, Without Specific Plan (Lifetime) No   4. Active Suicidal Ideation with Some Intent to Act, Without Specific Plan (Recent) No   5. Active Suicidal Ideation with Specific Plan and Intent (Lifetime) No   5. Active Suicidal Ideation with Specific Plan and Intent (Recent) No   Most Severe Ideation Rating (Lifetime) 2   Frequency (Lifetime) 2   Duration (Lifetime) 2   Controllability (Lifetime) 2   Protective Factors  (Lifetime) 2   Most Severe Ideation Rating (Past Month) 1   Actual Attempt (Lifetime) No   Actual Attempt (Past 3 Months) No   Has subject engaged in non-suicidal self-injurious behavior? (Lifetime) No   Has subject engaged in non-suicidal self-injurious behavior? (Past 3 Months) No   Interrupted Attempts (Lifetime) No   Interrupted Attempts (Past 3 Months) No   Aborted or Self-Interrupted Attempt (Lifetime) No   Aborted or Self-Interrupted Attempt (Past 3 Months) No   Preparatory Acts or Behavior (Lifetime) No   Preparatory Acts or Behavior (Past 3 Months) No     Patient denies current homicidal ideation and behaviors.  Patient denies current self-injurious ideation and  behaviors.    Patient denied risk behaviors associated with substance use.  Patient denies any high risk behaviors associated with mental health symptoms.  Patient reports the following current concerns for their personal safety: None.  Patient reports there are not  firearms in the house. .     History of Safety Concerns:  Patient denied a history of homicidal ideation.     Patient denied a history of personal safety concerns.    Patient denied a history of assaultive behaviors.    Patient denied a history of sexual assault behaviors.     Patient denied a history of risk behaviors associated with substance use.  Patient denies any history of high risk behaviors associated with mental health symptoms.  Patient reports the following protective factors: positive relationships positive social network and positive family connections, forward/future oriented thinking, safe and stable environment, regular sleep, regular physical activity, secure attachment, daily obligations and structured day    Risk Plan:  See Recommendations for Safety and Risk Management Plan    Review of Symptoms per patient report:  Depression: Change in sleep, Excessive or inappropriate guilt, Difficulties concentrating, Feelings of hopelessness, Feelings of helplessness, Low self-worth, Ruminations and Feeling sad, down, or depressed  Preciuos:  No Symptoms  Psychosis: No Symptoms  Anxiety: Excessive worry, Nervousness, Social anxiety, Ruminations, Poor concentration and Irritability  Panic:  No symptoms  Post Traumatic Stress Disorder:  No Symptoms   Eating Disorder: No Symptoms  ADD / ADHD:  No symptoms  Conduct Disorder: No symptoms  Autism Spectrum Disorder: No symptoms  Obsessive Compulsive Disorder: No Symptoms    Patient reports the following compulsive behaviors and treatment history: none.      Diagnostic Criteria:   A. Excessive anxiety and worry about a number of events or activities (such as work or school performance).   B. The person  finds it difficult to control the worry.  C. Select 3 or more symptoms (required for diagnosis). Only one item is required in children.   - Restlessness or feeling keyed up or on edge.    - Being easily fatigued.    - Difficulty concentrating or mind going blank.    - Irritability.    - Muscle tension.    - Sleep disturbance (difficulty falling or staying asleep, or restless unsatisfying sleep).   D. The focus of the anxiety and worry is not confined to features of an Axis I disorder.  E. The anxiety, worry, or physical symptoms cause clinically significant distress or impairment in social, occupational, or other important areas of functioning.   A) Recurrent episode(s) - symptoms have been present during the same 2-week period and represent a change from previous functioning 5 or more symptoms (required for diagnosis)   - Depressed mood. Note: In children and adolescents, can be irritable mood.     - Diminished interest or pleasure in all, or almost all, activities.    - Significant weight none.    - Increased sleep.    - Fatigue or loss of energy.    - Feelings of worthlessness or inappropriate and excessive guilt.    - Diminished ability to think or concentrate, or indecisiveness.     Functional Status:  Patient reports the following functional impairments: management of the household and or completion of tasks, organization, self-care and work / vocational responsibilities.     WHODAS:   WHODAS 2.0 Total Score 10/8/2020   Total Score 39   Total Score [facility] 39       Clinical Summary:  1. Reason for assessment: Ongoing and increasing anxiety and depressive sx  .  2. Psychosocial, Cultural and Contextual Factors: PT   .  3. Principal DSM5 Diagnoses  (Sustained by DSM5 Criteria Listed Above):   296.32 (F33.1) Major Depressive Disorder, Recurrent Episode, Moderate _  300.02 (F41.1) Generalized Anxiety Disorder.  6. Prognosis: Expect Improvement.  7. Likely consequences of symptoms if not treated: increasing  struggle with daily life.  8. Client strengths include:  committed to sobriety, employed, goal-focused and good listener .     Recommendations:     1. Plan for Safety and Risk Management:   Recommended that patient call 911 or go to the local ED should there be a change in any of these risk factors..          Report to child / adult protection services was NA.     8. Records:    were reviewed at time of assessment.   Information in this assessment was obtained from the medical record and  provided by patient who is a good historian.    Patient will have open access to their mental health medical record.      Eval type:  Mental Health    Provider Name/ Credentials:  RADHA Katz  October 8, 2020

## 2020-10-09 ASSESSMENT — ANXIETY QUESTIONNAIRES: GAD7 TOTAL SCORE: 21

## 2020-10-09 ASSESSMENT — PATIENT HEALTH QUESTIONNAIRE - PHQ9: SUM OF ALL RESPONSES TO PHQ QUESTIONS 1-9: 22

## 2020-10-17 ENCOUNTER — OFFICE VISIT (OUTPATIENT)
Dept: URGENT CARE | Facility: URGENT CARE | Age: 20
End: 2020-10-17
Payer: COMMERCIAL

## 2020-10-17 VITALS
OXYGEN SATURATION: 95 % | DIASTOLIC BLOOD PRESSURE: 73 MMHG | BODY MASS INDEX: 25.37 KG/M2 | WEIGHT: 162 LBS | SYSTOLIC BLOOD PRESSURE: 142 MMHG | TEMPERATURE: 98.9 F | HEART RATE: 89 BPM

## 2020-10-17 DIAGNOSIS — R30.0 DYSURIA: Primary | ICD-10-CM

## 2020-10-17 LAB
ALBUMIN UR-MCNC: NEGATIVE MG/DL
APPEARANCE UR: CLEAR
BILIRUB UR QL STRIP: NEGATIVE
COLOR UR AUTO: YELLOW
GLUCOSE UR STRIP-MCNC: NEGATIVE MG/DL
HGB UR QL STRIP: NEGATIVE
KETONES UR STRIP-MCNC: NEGATIVE MG/DL
LEUKOCYTE ESTERASE UR QL STRIP: NEGATIVE
NITRATE UR QL: NEGATIVE
PH UR STRIP: 7 PH (ref 5–7)
SOURCE: NORMAL
SP GR UR STRIP: 1.01 (ref 1–1.03)
UROBILINOGEN UR STRIP-ACNC: 0.2 EU/DL (ref 0.2–1)

## 2020-10-17 PROCEDURE — 99213 OFFICE O/P EST LOW 20 MIN: CPT | Performed by: FAMILY MEDICINE

## 2020-10-17 PROCEDURE — 87491 CHLMYD TRACH DNA AMP PROBE: CPT | Performed by: FAMILY MEDICINE

## 2020-10-17 PROCEDURE — 81003 URINALYSIS AUTO W/O SCOPE: CPT | Performed by: FAMILY MEDICINE

## 2020-10-17 PROCEDURE — 87591 N.GONORRHOEAE DNA AMP PROB: CPT | Performed by: FAMILY MEDICINE

## 2020-10-17 NOTE — PROGRESS NOTES
Subjective         HPI   Presents with 3-4 day hx of some mild dysuria.  Has had same sexual partner x 1+ year- is not concerned about STI.  No fever or chills.  No flank pain.  Otherwise healthy.      Patient Active Problem List   Diagnosis     Diffuse alopecia areata     Pneumonia     Attention deficit disorder     Anxiety state     Adjustment disorder with mixed anxiety and depressed mood     Past Surgical History:   Procedure Laterality Date     NO HISTORY OF SURGERY         Social History     Tobacco Use     Smoking status: Never Smoker     Smokeless tobacco: Never Used     Tobacco comment: non smoking home   Substance Use Topics     Alcohol use: No     Family History   Problem Relation Age of Onset     Family History Negative Mother      Family History Negative Father      Family History Negative Brother      Anxiety Disorder Paternal Grandmother      Depression Paternal Grandmother      Anxiety Disorder Paternal Grandfather      Depression Paternal Grandfather          No current outpatient medications on file.     Allergies   Allergen Reactions     No Known Drug Allergies      No lab results found.   BP Readings from Last 3 Encounters:   10/17/20 (!) 142/73   08/27/20 129/79   03/07/20 139/85    Wt Readings from Last 3 Encounters:   10/17/20 73.5 kg (162 lb)   08/27/20 72.7 kg (160 lb 3.2 oz)   03/07/20 72.6 kg (160 lb)                    Reviewed and updated as needed this visit by Provider         Review of Systems   ROS COMP: Constitutional, HEENT, cardiovascular, pulmonary, GI, , musculoskeletal, neuro, skin, endocrine and psych systems are negative, except as otherwise noted.      Objective    BP (!) 142/73   Pulse 89   Temp 98.9  F (37.2  C) (Tympanic)   Wt 73.5 kg (162 lb)   SpO2 95%   BMI 25.37 kg/m      Physical Exam   GENERAL: healthy, alert and no distress  EYES: Eyes grossly normal to inspection, PERRL and conjunctivae and sclerae normal  MS: no gross musculoskeletal defects noted, no  edema  SKIN: no suspicious lesions or rashes  NEURO: Normal strength and tone, mentation intact and speech normal  PSYCH: mentation appears normal, affect normal/bright    Diagnostic Test Results:  Labs reviewed in Epic  Results for orders placed or performed in visit on 10/17/20 (from the past 24 hour(s))   UA reflex to Microscopic and Culture    Specimen: Midstream Urine   Result Value Ref Range    Color Urine Yellow     Appearance Urine Clear     Glucose Urine Negative NEG^Negative mg/dL    Bilirubin Urine Negative NEG^Negative    Ketones Urine Negative NEG^Negative mg/dL    Specific Gravity Urine 1.015 1.003 - 1.035    Blood Urine Negative NEG^Negative    pH Urine 7.0 5.0 - 7.0 pH    Protein Albumin Urine Negative NEG^Negative mg/dL    Urobilinogen Urine 0.2 0.2 - 1.0 EU/dL    Nitrite Urine Negative NEG^Negative    Leukocyte Esterase Urine Negative NEG^Negative    Source Midstream Urine            Assessment & Plan     1. Dysuria    - UA reflex to Microscopic and Culture  - NEISSERIA GONORRHOEA PCR  - CHLAMYDIA TRACHOMATIS PCR     Reassured UA is negative.  Does not desire empiric treatment for GC/Chlamydia today.  Recommend increased fluids.  Close Follow-up if no change or worsening symptoms.    Teodora Mackenzie MD  [facility]

## 2020-10-18 LAB
C TRACH DNA SPEC QL NAA+PROBE: NEGATIVE
N GONORRHOEA DNA SPEC QL NAA+PROBE: NEGATIVE
SPECIMEN SOURCE: NORMAL
SPECIMEN SOURCE: NORMAL

## 2020-11-07 ENCOUNTER — HEALTH MAINTENANCE LETTER (OUTPATIENT)
Age: 20
End: 2020-11-07

## 2021-08-06 ENCOUNTER — THERAPY VISIT (OUTPATIENT)
Dept: PHYSICAL THERAPY | Facility: CLINIC | Age: 21
End: 2021-08-06
Payer: COMMERCIAL

## 2021-08-06 DIAGNOSIS — M25.552 HIP PAIN, LEFT: ICD-10-CM

## 2021-08-06 PROCEDURE — 97161 PT EVAL LOW COMPLEX 20 MIN: CPT | Mod: GP | Performed by: PHYSICAL THERAPIST

## 2021-08-06 PROCEDURE — 97110 THERAPEUTIC EXERCISES: CPT | Mod: GP | Performed by: PHYSICAL THERAPIST

## 2021-08-06 NOTE — PROGRESS NOTES
Physical Therapy Initial Evaluation  Subjective:    Therapist Generated HPI Evaluation  Problem details: Pt presents to PT with a chief complaint of L anterior hip pain and stiffness with reported gradual onset ~2 months ago. Pt started noticing some increased stiffness in his L hip when trying to stretch on his hip on his own. L hip pain worse with deep squatting and pivoting/twisting. .         Type of problem:  Left hip.    This is a new condition.  Condition occurred with:  Insidious onset.  Where condition occurred: for unknown reasons.  Patient reports pain:  Anterior.  Pain is described as aching and is intermittent.  Pain radiates to:  No radiation. Pain is worse in the P.M..  Since onset symptoms are gradually improving.  Associated symptoms:  Loss of motion/stiffness. Symptoms are exacerbated by ascending stairs, descending stairs and bending/squatting  and relieved by NSAID's.      Restrictions due to condition include:  Working in normal job without restrictions.  Barriers include:  None as reported by patient.    Patient Health History         Pain is reported as 4/10 on pain scale.  General health as reported by patient is excellent.  Pertinent medical history includes: none.   Red flags:  None as reported by patient.  Medical allergies: none.   Surgeries include:  None.    Current medications:  None.    Occupation: .   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                                    Objective:  System                                           Hip Evaluation  HIP AROM:  AROM:    Left Hip:     Normal    Right Hip:   Normal                  Hip PROM:                    Pain: Pain + with end range L hip Flexion and IR PROM        Hip Strength:    Flexion:   Left: 5-/5   +  Pain:  Right: 5/5   Pain:                    Extension:  Left: 5/5  Pain:Right: 5/5    Pain:    Abduction:  Left: 5/5     Pain:Right: 5/5    Pain:    Internal Rotation:  Left: 5/5     Pain:Right: 5/5   Pain:  External Rotation:  Left: 5-/5   Pain:  Right: 5/5   Pain:              Hip Palpation:    Left hip tenderness present at:   hip flexors    Functional Testing:          Quad:    Single leg squat:   Left:   Mild loss of control and femoral IR  Right:                           General Evaluation:          Lower Extremity Flexibility:          Hip Flexors:  Decreased flexibility                                                                     ROS    Assessment/Plan:    Patient is a 21 year old male with left side hip complaints.    Patient has the following significant findings with corresponding treatment plan.                Diagnosis 1:  L hip pain  Pain -  manual therapy, splint/taping/bracing/orthotics, self management and education  Decreased ROM/flexibility - manual therapy and therapeutic exercise  Decreased strength - therapeutic exercise and therapeutic activities  Impaired muscle performance - neuro re-education    Therapy Evaluation Codes:   Cumulative Therapy Evaluation is: Low complexity.    Previous and current functional limitations:  (See Goal Flow Sheet for this information)    Short term and Long term goals: (See Goal Flow Sheet for this information)     Communication ability:  Patient appears to be able to clearly communicate and understand verbal and written communication and follow directions correctly.  Treatment Explanation - The following has been discussed with the patient:   RX ordered/plan of care  Anticipated outcomes  Possible risks and side effects  This patient would benefit from PT intervention to resume normal activities.   Rehab potential is good.    Frequency:  1 X week, once daily  Duration:  for 2 weeks tapering to 2 X a month over 4 weeks  Discharge Plan:  Achieve all LTG.  Independent in home treatment program.  Reach maximal therapeutic benefit.    Please refer to the daily flowsheet for treatment today, total treatment time and time spent  performing 1:1 timed codes.

## 2021-08-13 ENCOUNTER — THERAPY VISIT (OUTPATIENT)
Dept: PHYSICAL THERAPY | Facility: CLINIC | Age: 21
End: 2021-08-13
Payer: COMMERCIAL

## 2021-08-13 DIAGNOSIS — M25.552 HIP PAIN, LEFT: ICD-10-CM

## 2021-08-13 PROCEDURE — 97112 NEUROMUSCULAR REEDUCATION: CPT | Mod: GP | Performed by: PHYSICAL THERAPIST

## 2021-08-13 PROCEDURE — 97110 THERAPEUTIC EXERCISES: CPT | Mod: GP | Performed by: PHYSICAL THERAPIST

## 2021-09-05 ENCOUNTER — HEALTH MAINTENANCE LETTER (OUTPATIENT)
Age: 21
End: 2021-09-05

## 2021-09-16 ENCOUNTER — THERAPY VISIT (OUTPATIENT)
Dept: PHYSICAL THERAPY | Facility: CLINIC | Age: 21
End: 2021-09-16
Payer: COMMERCIAL

## 2021-09-16 DIAGNOSIS — M25.552 HIP PAIN, LEFT: Primary | ICD-10-CM

## 2021-09-16 PROCEDURE — 97112 NEUROMUSCULAR REEDUCATION: CPT | Mod: GP | Performed by: PHYSICAL THERAPIST

## 2021-09-16 PROCEDURE — 97110 THERAPEUTIC EXERCISES: CPT | Mod: GP | Performed by: PHYSICAL THERAPIST

## 2021-09-16 NOTE — PROGRESS NOTES
Subjective:  HPI  Physical Exam                    Objective:  System    Physical Exam    General     ROS    Assessment/Plan:    DISCHARGE REPORT    Progress reporting period is from 08/06/21 to 09/16/21.       SUBJECTIVE  Subjective changes noted by patient:  Reduced pain, improved ability to perform CKC exercises and activities.  Subjective: Patient reports to therapy with little to no pain in his L hip. Patient reports adherence to entire HEP. Patient reports mild imbalance/instability in his ankle today.    Current pain level is 1/10 Current Pain level: 1/10 (Felt at end range PROM for L hip flexion.).     Previous pain level was  4/10 Initial Pain level: 4/10.   Changes in function:  Yes (See Goal flowsheet attached for changes in current functional level)  Adverse reaction to treatment or activity: None    OBJECTIVE  Changes noted in objective findings:  Yes, improved L knee flexion ROM. Reduced pain scale reporting. Increased tolerance for CKC exercises.  Objective: Patient reports pain symptoms in 123 degrees of PROM L hip flexion. Patient able to tolerate more intense SLS and hip ABD strengthening exercises. Patient tolerated new balance exercises today as well.     ASSESSMENT/PLAN  Updated problem list and treatment plan: Diagnosis 1:  L Hip Pain  Pain -  self management, education and home program  Decreased ROM/flexibility - manual therapy, therapeutic exercise and home program  Impaired muscle performance - neuro re-education and home program  STG/LTGs have been met or progress has been made towards goals:  Yes (See Goal flow sheet completed today.)  Assessment of Progress: The patient's condition is improving.  Self Management Plans:  Patient has been instructed in a home treatment program.  Patient is independent in a home treatment program.  Patient  has been instructed in self management of symptoms.    Pa continues to require the following intervention to meet STG and LTG's:  PT intervention is  no longer required to meet STG/LTG.    Recommendations:  This patient is ready to be discharged from therapy and continue their home treatment program.    Please refer to the daily flowsheet for treatment today, total treatment time and time spent performing 1:1 timed codes.

## 2021-11-02 PROBLEM — M25.552 HIP PAIN, LEFT: Status: RESOLVED | Noted: 2021-08-06 | Resolved: 2021-11-02

## 2021-12-26 ENCOUNTER — HEALTH MAINTENANCE LETTER (OUTPATIENT)
Age: 21
End: 2021-12-26

## 2022-10-23 ENCOUNTER — HEALTH MAINTENANCE LETTER (OUTPATIENT)
Age: 22
End: 2022-10-23

## 2023-04-02 ENCOUNTER — HEALTH MAINTENANCE LETTER (OUTPATIENT)
Age: 23
End: 2023-04-02

## 2023-04-06 ENCOUNTER — NURSE TRIAGE (OUTPATIENT)
Dept: NURSING | Facility: CLINIC | Age: 23
End: 2023-04-06
Payer: COMMERCIAL

## 2023-04-06 NOTE — TELEPHONE ENCOUNTER
"Nurse Triage SBAR    Is this a 2nd Level Triage? NO    Situation: Patient states about 10-12 days ago he injured his finger and needed to have sutures     Background:  was seen at [facility] for initial visit    Assessment: cut his finger at work  10-12 days ago  Had to get stitches on his left thumb   States he was supposed to get his stitches out   States he doesn't feel like his wound is healing well   State the skin that is a the area of injury to his thumb is hard and pale color- states just in one part of the wound.   States the wound \"doesn't look the greatest\"       Protocol Recommended Disposition:   See in Office Today    Recommendation: Advised to be seen today - patient states that he is interested in - reviewed locations with patient and he verbalizes understanding. Patient states he may also go back to the  that he had his repair at. Declines additional questions.      UC today    Does the patient meet one of the following criteria for ADS visit consideration? 16+ years old, with an [facility] PCP         Reason for Disposition    Suture or staple removal is overdue    Additional Information    Negative: Major abdominal surgical wound and visible internal organs    Negative: Sounds like a life-threatening emergency to the triager    Negative: Surgical incision symptoms and questions    Negative: Wound looks infected    Negative: New cut and caller wonders if it needs stitches    Negative: Bleeding won't stop after 10 minutes of direct pressure (using correct technique)    Negative: Wound gaping open after sutures (or staples) AND < 48 hours since wound re-opened    Negative: Patient sounds very sick or weak to the triager    Negative: Major surgical wound that is starting to open up    Negative: Wound gaping open after sutures (or staples) AND > 48 hours since wound re-opened AND length of opening > 1/2 inch (12 mm)    Negative: Face wound gaping open after sutures (or staples) AND > 48 hours " since wound re-opened AND length of opening > 1/4 inch (6 mm)    Protocols used: SUTURE OR STAPLE [id number]

## 2023-04-10 ENCOUNTER — OFFICE VISIT (OUTPATIENT)
Dept: URGENT CARE | Facility: URGENT CARE | Age: 23
End: 2023-04-10
Payer: OTHER MISCELLANEOUS

## 2023-04-10 VITALS
TEMPERATURE: 99.8 F | OXYGEN SATURATION: 98 % | WEIGHT: 153 LBS | SYSTOLIC BLOOD PRESSURE: 155 MMHG | DIASTOLIC BLOOD PRESSURE: 110 MMHG | HEIGHT: 67 IN | BODY MASS INDEX: 24.01 KG/M2 | HEART RATE: 106 BPM

## 2023-04-10 DIAGNOSIS — F41.9 ANXIETY: ICD-10-CM

## 2023-04-10 DIAGNOSIS — Z48.02 VISIT FOR SUTURE REMOVAL: Primary | ICD-10-CM

## 2023-04-10 PROCEDURE — 99212 OFFICE O/P EST SF 10 MIN: CPT | Performed by: PHYSICIAN ASSISTANT

## 2023-04-10 NOTE — PROGRESS NOTES
Pa Galvan presents to the clinic for removal of sutures placed by [facility]. The patient has had sutures in place for 18 days. There has been no patient reported signs or symptoms of infection or drainage. 4  sutures (patient states one fell out a week after placed) and sutures,staples, staple, steri strips are seen and located on the left thumb. Tetanus status is up to date. All sutures and sutures,staples, steri strips were easily removed today. Routine wound care discussed by the RN or provider. The patient will follow up as needed.  Patient requesting psych referral for anxiety.

## 2023-07-20 ENCOUNTER — OFFICE VISIT (OUTPATIENT)
Dept: URGENT CARE | Facility: URGENT CARE | Age: 23
End: 2023-07-20
Payer: COMMERCIAL

## 2023-07-20 VITALS
TEMPERATURE: 98 F | DIASTOLIC BLOOD PRESSURE: 76 MMHG | SYSTOLIC BLOOD PRESSURE: 131 MMHG | OXYGEN SATURATION: 98 % | RESPIRATION RATE: 20 BRPM | HEART RATE: 78 BPM

## 2023-07-20 DIAGNOSIS — R35.0 URINARY FREQUENCY: Primary | ICD-10-CM

## 2023-07-20 LAB
ALBUMIN UR-MCNC: NEGATIVE MG/DL
APPEARANCE UR: CLEAR
BILIRUB UR QL STRIP: NEGATIVE
COLOR UR AUTO: YELLOW
GLUCOSE UR STRIP-MCNC: NEGATIVE MG/DL
HGB UR QL STRIP: NEGATIVE
KETONES UR STRIP-MCNC: NEGATIVE MG/DL
LEUKOCYTE ESTERASE UR QL STRIP: NEGATIVE
NITRATE UR QL: NEGATIVE
PH UR STRIP: 7 [PH] (ref 5–7)
SP GR UR STRIP: 1.02 (ref 1–1.03)
UROBILINOGEN UR STRIP-ACNC: 0.2 E.U./DL

## 2023-07-20 PROCEDURE — 87491 CHLMYD TRACH DNA AMP PROBE: CPT | Performed by: FAMILY MEDICINE

## 2023-07-20 PROCEDURE — 87591 N.GONORRHOEAE DNA AMP PROB: CPT | Performed by: FAMILY MEDICINE

## 2023-07-20 PROCEDURE — 99213 OFFICE O/P EST LOW 20 MIN: CPT | Performed by: FAMILY MEDICINE

## 2023-07-20 PROCEDURE — 81003 URINALYSIS AUTO W/O SCOPE: CPT | Performed by: FAMILY MEDICINE

## 2023-07-20 RX ORDER — PHENAZOPYRIDINE HYDROCHLORIDE 200 MG/1
200 TABLET, FILM COATED ORAL 3 TIMES DAILY PRN
Qty: 6 TABLET | Refills: 0 | Status: SHIPPED | OUTPATIENT
Start: 2023-07-20 | End: 2023-07-22

## 2023-07-20 NOTE — PROGRESS NOTES
Assessment & Plan     Urinary frequency  pyridium  - UA Macroscopic with reflex to Microscopic and Culture  - Neisseria gonorrhoeae PCR  - Chlamydia trachomatis PCR  - phenazopyridine (PYRIDIUM) 200 MG tablet  Dispense: 6 tablet; Refill: 0             No follow-ups on file.    Pranav Roblero MD  [facility] MIAN Winn is a 23 year old male who presents to clinic today for the following health issues:  Chief Complaint   Patient presents with     UTI     Poss uti/frequency/inflammation       HPI    Here with concern about frequent urination.  After finishes urinating will feel like needs to go.  No fever.  No back pain out of ordinary.    No STD concern.        Review of Systems        Objective    /76   Pulse 78   Temp 98  F (36.7  C) (Tympanic)   Resp 20   SpO2 98%   Physical Exam  Vitals and nursing note reviewed.   Constitutional:       Appearance: Normal appearance.   Cardiovascular:      Rate and Rhythm: Normal rate and regular rhythm.      Pulses: Normal pulses.      Heart sounds: Normal heart sounds.   Pulmonary:      Effort: Pulmonary effort is normal.      Breath sounds: Normal breath sounds.   Neurological:      Mental Status: He is alert.

## 2023-07-21 LAB
C TRACH DNA SPEC QL NAA+PROBE: NEGATIVE
N GONORRHOEA DNA SPEC QL NAA+PROBE: NEGATIVE

## 2024-06-08 ENCOUNTER — HEALTH MAINTENANCE LETTER (OUTPATIENT)
Age: 24
End: 2024-06-08

## 2025-06-15 ENCOUNTER — HEALTH MAINTENANCE LETTER (OUTPATIENT)
Age: 25
End: 2025-06-15